# Patient Record
Sex: MALE | URBAN - METROPOLITAN AREA
[De-identification: names, ages, dates, MRNs, and addresses within clinical notes are randomized per-mention and may not be internally consistent; named-entity substitution may affect disease eponyms.]

---

## 2024-10-22 ENCOUNTER — APPOINTMENT (RX ONLY)
Dept: URBAN - METROPOLITAN AREA CLINIC 48 | Facility: CLINIC | Age: 34
Setting detail: DERMATOLOGY
End: 2024-10-22

## 2024-10-22 DIAGNOSIS — R60.0 LOCALIZED EDEMA: ICD-10-CM

## 2024-10-22 PROCEDURE — 99203 OFFICE O/P NEW LOW 30 MIN: CPT

## 2024-10-22 PROCEDURE — ? COUNSELING

## 2024-10-22 PROCEDURE — ? ADDITIONAL NOTES

## 2024-10-22 PROCEDURE — ? TREATMENT REGIMEN

## 2024-10-22 NOTE — PROCEDURE: ADDITIONAL NOTES
Detail Level: Zone
Render Risk Assessment In Note?: no
Additional Notes: Bilateral lower extremity pitting edema. Started three months ago. Started with pedal edema. Denies SOB, chest pain, wheezing, fever, myalgias, chills, dizziness, headache, numbness, tingling, weakness\\nPCP—per pt: US (-), EKG WNL; reports abnormal renal results on recent labs. Has referral to cardiology, appointment upcoming. \\nNo current dermatology concerns visualized in clinic, patient has no skin changes or complaints at this time.

## 2024-10-22 NOTE — PROCEDURE: TREATMENT REGIMEN
Detail Level: Zone
Plan: Recommend referral to infectious disease, cardiology, nephrology, hepatology.